# Patient Record
Sex: MALE | Race: WHITE | URBAN - METROPOLITAN AREA
[De-identification: names, ages, dates, MRNs, and addresses within clinical notes are randomized per-mention and may not be internally consistent; named-entity substitution may affect disease eponyms.]

---

## 2018-05-10 ENCOUNTER — TRANSFERRED RECORDS (OUTPATIENT)
Dept: HEALTH INFORMATION MANAGEMENT | Facility: CLINIC | Age: 68
End: 2018-05-10

## 2018-05-29 ENCOUNTER — HOSPITAL ENCOUNTER (OUTPATIENT)
Dept: BEHAVIORAL HEALTH | Facility: CLINIC | Age: 68
Discharge: HOME | End: 2018-05-29
Attending: SOCIAL WORKER | Admitting: SOCIAL WORKER
Payer: COMMERCIAL

## 2018-05-29 VITALS — WEIGHT: 180 LBS | BODY MASS INDEX: 26.66 KG/M2 | HEIGHT: 69 IN

## 2018-05-29 PROCEDURE — H0001 ALCOHOL AND/OR DRUG ASSESS: HCPCS

## 2018-05-29 ASSESSMENT — ANXIETY QUESTIONNAIRES
IF YOU CHECKED OFF ANY PROBLEMS ON THIS QUESTIONNAIRE, HOW DIFFICULT HAVE THESE PROBLEMS MADE IT FOR YOU TO DO YOUR WORK, TAKE CARE OF THINGS AT HOME, OR GET ALONG WITH OTHER PEOPLE: NOT DIFFICULT AT ALL
3. WORRYING TOO MUCH ABOUT DIFFERENT THINGS: NOT AT ALL
2. NOT BEING ABLE TO STOP OR CONTROL WORRYING: NOT AT ALL
6. BECOMING EASILY ANNOYED OR IRRITABLE: NOT AT ALL
7. FEELING AFRAID AS IF SOMETHING AWFUL MIGHT HAPPEN: NOT AT ALL
5. BEING SO RESTLESS THAT IT IS HARD TO SIT STILL: SEVERAL DAYS
4. TROUBLE RELAXING: NOT AT ALL
GAD7 TOTAL SCORE: 2
1. FEELING NERVOUS, ANXIOUS, OR ON EDGE: SEVERAL DAYS

## 2018-05-29 ASSESSMENT — PAIN SCALES - GENERAL: PAINLEVEL: MODERATE PAIN (4)

## 2018-05-29 NOTE — PROGRESS NOTES
COMPREHENSIVE ASSESSMENT    Background Information   Original Date of Assessment:  5/29/2018 Referral Source:  Self   Evaluation Counselor:  AMPARO Foss Counselor Telephone #:   [phone] Assessment Site:  [facility]   Patient Name:   Haley Zhao YOB: 1950 Age:  67 year old Gender:  male Medical Record #:  [id number]   Patient's Primary Language:  English Do you need assistance with reading, writing or hearing?  Do you need a ?  No   Current Address:  [address] [place] [address]   Patient Phone Number:  [phone] (home)    Patient Mobile Number:    Telephone Information:   Mobile [phone]      Patient E-mail Address:  [email or web address]     Which pronouns do you prefer to be referred by?  He/Him     With which race do you identify?  White     This patient was seen for a face to face assessment on 5/29/2018:  Yes       Crisis Intervention Questions     1. Are you currently having severe withdrawal symptoms that are putting yourself or others in danger?  No    2. Are you currently having severe medical problems that require immediate attention?  No    3. Are you currently having severe emotional or behavioral problems that are putting yourself or others at risk of harm?  No    Precipitating Event Summary     What are the circumstances or events that have led up to you participating in this evaluation today?    Seeking support to help change drinking.    Have you participated in prior substance use disorder evaluations?     Yes. When, Where, and What circumstances: [facility] referral here.    Comprehensive Substance Use History    X = Primary Drug Used Age of First Use    Pattern of Substance Use   Make sure to include period of heaviest use in life and a use history within the past year if applicable.  Please include a pattern with a specific range of amounts used and a frequency of use:  (DSM-5: Sx #3) Date  of last use  Quantity of last use if within the past 30 days Withdrawal Potential?  Screen for need of IP detox or other medical intervention Method of use  (Oral, smoked, snorted, IV, etc)   x Alcohol       20 Past 10-12 years: 6-7x/week, up to 2-750mL (wine, 4x/week) 20  9pm no oral    Marijuana/  Hashish     N/A        Cocaine/Crack       N/A        Meth/  Amphetamines       N/A        Heroin       N/A        Other Opiates/  Synthetics     N/A        Inhalants      N/A        Benzodiazepines       N/A        Hallucinogens       N/A        Barbiturates/  Sedatives/  Hypnotics   N/A        Over-the-Counter Drugs     N/A        Other       N/A        Nicotine       N/A         DIMENSION I - Acute Intoxication / Withdrawal Potential     1. Do you use greater amounts of alcohol/other drugs to feel intoxicated, use greater amounts to achieve the desired effect, or use the same amount and get less of an effect?  (DSM-5: Sx #10)     No, for me the opposite is true.    2. Have you ever had an inpatient detoxification admission?  (DSM-5: Sx #11)    No    3. Withdrawal Symptoms:  Within the past year: Within the past 30 days:   None   None       4. Is the patient currently exhibiting symptoms of withdrawal?  (DSM-5: Sx #11)    No    5. Based on the above information, does treatment for withdrawal symptoms appear to be a need at this time?  (DSM-5: Sx #11)    No    Dimension I Ratings Summary   Acute intoxication/Withdrawal potential - The placing authority must use the criteria in Dimension I to determine a client s acute intoxication and withdrawal potential.    RISK DESCRIPTIONS - Severity ratin Client displays full functioning with good ability to tolerate and cope with withdrawal discomfort. No signs or symptoms of intoxication or withdrawal or resolving signs or symptoms.    REASONS SEVERITY WAS ASSIGNED (What about the amount of the person s use and date of most recent use and history of withdrawal problems  suggests the potential of withdrawal symptoms requiring professional assistance?)     Client reports use within 24 hours of evaluation, he denies any withdrawal discomfort or concern.  No signs/symptoms of intoxication or withdrawal observed.         DIMENSION II - Biomedical Complications and Conditions     1. Do you have any current health/medical conditions?(Include any infectious diseases, allergies, chronic or acute pain, history of chronic conditions)       Yes.   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    2. Do you have a health care provider? When was your most recent appointment? What concerns were identified?     [facility], Dr. Wilian Palomino    3. If yes indicated by answers to items 1 or 2: How do you deal with these concerns? Is that working for you? If you are not receiving care for this problem, why not?      medication    4. Please list all of the patient's current medication(s) including health management, psychotropic, pain management, over-the-counter and/or herbal supplements:     Klonopin    5. When did you last take your medication?     5/29/18    6. Do you currently self-administer your medications?      Yes    7. Do you follow current medical recommendations/take medications as prescribed?     Yes    8. Has a health care provider/healer ever recommended that you reduce or quit alcohol/drug use?  (DSM-5: Sx #9)    Yes    9. Are you pregnant?     NA, Male    10. Have you had any injuries, assaults/violence towards you, accidents, health related issues, overdose(s) or hospitalizations related to your use of alcohol or other drugs:  (DSM-5: Sx #8 & #9)    No    11. Have you engaged in any risk-taking behavior that would put you at risk for exposure to blood-borne or sexually transmitted diseases?    No    12. Are you on a special diet?    No    13. Do you have any concerns regarding your nutritional status?    No    14. Have you had any appetite  changes in the last 3 months?    No    15. Have you had weight loss or weight gain of more than 10 lbs in the last 3 months?   If patient gained or lost more than 10 lbs, then refer to program RN / attending Physician for assessment.    No    16. Was the patient informed of BMI?  No        17. Do you have any dental problems?    No    18. Do you have any specific physical needs or disabilities that would need accommodation in a treatment program?     No    Dimension II Ratings Summary   Biomedical Conditions and Complications - The placing authority must use the criteria in Dimension II to determine a client s biomedical conditions and complications.   RISK DESCRIPTIONS - Severity ratin Client displays full functioning with good ability to cope with physical discomfort.    REASONS SEVERITY WAS ASSIGNED (What physical/medical problems does this person have that would inhibit his or her ability to participate in treatment? What issues does he or she have that require assistance to address?)    Client reports he has a primary care provider.  He denies any immediate health concerns or anything that would interfere with treatment.         DIMENSION III - Emotional, Behavioral, Cognitive Conditions and Complications     Childhood Environmental Background     1. Please tell me what it was like growing up in your family. (please include any history of substance abuse, mental health issues, emotional/physical/sexual abuse, forms of discipline, and support)     Raised by mom/dad, 9 children, fourth in birth order.  One sister some alcohol use, the rest are very mild and almost teetotalers. Family hx depression, anxiety, panic disorder, ADD.  My dad was very similar to me, growing up he was 5-6 beer a night.  Once my mom went to the nursing home his consumption seemed to double and then he stopped at the age of 89 year old.  3 paternal uncles.    GAIN Short Screener     2. When was the last time that you had significant  problems...  A. with feeling very trapped, lonely, sad, blue, depressed or hopeless  about the future? 1+ years ago    B. with sleep trouble, such as bad dreams, sleeping restlessly, or falling  asleep during the day? Never    C. with feeling very anxious, nervous, tense, scared, panicked, or like  something bad was going to happen? Never    D. with becoming very distressed and upset when something reminded  you of the past? Never    E. with thinking about ending your life or committing suicide? 1+ years ago    3. When was the last time that you did the following things two or more times?  A. Lied or conned to get things you wanted or to avoid having to do  something? Never    B. Had a hard time paying attention at school, work, or home? Never    C. Had a hard time listening to instructions at school, work, or home? Never    D. Were a bully or threatened other people? Never    E. Started physical fights with other people? Never    Note: These questions are from the Global Appraisal of Individual Needs--Short Screener. Any item marked  past month  or  2 to 12 months ago  will be scored with a severity rating of at least 2.     For each item that has occurred in the past month or past year ask follow up questions to determine how often the person has felt this way or has the behavior occurred? How recently? How has it affected their daily living? And, whether they were using or in withdrawal at the time?    4. If the person has answered item 9E with  in the past year  or  the past month , ask about frequency and history of suicide in the family or someone close and whether they were under the influence.     NA    5. Has anyone close to you, a family member, a friend or a significant other attempted or completed a suicide?     Yes, explain: 1997 brother and he was a drinker.  A cousin did but we never talked about it.    6. If the person answered item 9E  in the past month  ask about intent, plan, means and access and  any other follow-up information to determine imminent risk. Document any actions taken to intervene on any identified imminent risk.      Denies.    PHQ-9, PEPE-7 and Suicide Risk Assessment   PHQ-9 on 5/29/2018 PEPE-7 on 5/29/2018   The patient's PHQ-9 score was 2 out of 27, indicating minimal depression.     The patient's PEPE-7 score was 2 out of 21, indicating minimal anxiety.         Suicide Screening Questions:   Have you wished you were dead or wished you could go to sleep and not wake up?     No   Have you had actual thoughts of killing yourself?     Yes   When did you have these thoughts?     During the recession when I lost my job and just thinking that my family would be better with my insurance money.   Do you have any current intent or active desire to take your life?     No   Do you have a plan to take your life?     No   Have you ever made a suicide attempt?     No   Do you have access to pills, guns or other methods to kill yourself?     No     Guide to Risk Ratings   IDEATION: Active thoughts of suicide? INTENT: Intent to follow on suicide? PLAN: Plan to follow through on suicide? Level of Risk:   IF Yes Yes Yes Patient = High Emergent   IF Yes Yes No Patient = High Urgent/Non-Emergent   IF Yes No No Patient = Moderate Non-Urgent   IF No No   No Patient = Low Risk   The patient's ADDITIONAL RISK FACTORS and lack of PROTECTIVE FACTORS may increase their overall suicide risk ratings.     Patient's Responses (within the last 30 days)   IDEATION: Active thoughts of suicide?    No     INTENT: Intent to follow on suicide?    No     PLAN: Plan to follow through on suicide?    No     Determining the level of risk depends on the patient responses, suicide risk factors and protective factors.     Additional Risk Factors:    Someone close to the patient (family member/friend) completed a suicide     Significant history of having untreated or poorly treated mental health symptoms     Alcohol use   Protective  "Factors:    Having people in his/her life that would prevent the patient from considering committing suicide (i.e. young children, spouse, parents, etc.)     Risk Status   Emergent? No   Urgent / Non-Emergent? No   Present / Non- Urgent? No    Low Risk? Yes, Evaluation Counselors - Document in Epic / SBAR to counselor \"No identified risk\" and Treatment Counselors - Assess weekly in progress notes under Dimension 3 and summarize in Discharge / Treatment summary under Dimension 3.   Additional information to support suicide risk rating: There was no addtional information to provide at this time.  Please see the above suicide risk rating information.     Mental Health History and Mental Health Screening Questions     7. Have you ever been diagnosed with a mental health problem?     No    8. Have you ever been prescribed medications for mental health issues?    Yes, explain: have tried them but they made me feel worse which could be because I am drinking on them so putting a depressant on it.    9. Have you ever worked with a mental health therapist?    Have been seeing a counselor on/off, has been sporadic recently.    10. Do your current mental health providers know about your substance use history and/or about your current substance use?    Yes, explain: the focus was always a serious reduction.    11. Have you ever had an inpatient mental health hospital admission?    No    12. Have you ever hurt yourself, such as cutting, burning or hitting yourself?  No    13. Have you ever been verbally, emotionally, physically or sexually abused?      No    14. Have you lived through any traumatic or stressful life events, such as the death of someone close to you, witnessing violence, being a victim of crime, going through a bad break-up, or any other life event that had caused you significant distress?    Yes, explain: multiple job losses and losing so much of my worth, other thing would be family deaths.    15. If applicable, " have you had any of the following symptoms related to the trauma, abuse or other stressful life events? (dreams, intense memories, flashbacks, physical reactions, etc.)     No    16. If applicable, have you received counseling for trauma or abuse issues?      No    17. Have you ever touched or fondled someone else inappropriately or forced them to have sex with you against their will?    No    18. Have you ever felt obsessed by your sexual behavior, such as having sex with many partners, masturbating often, using pornography often?  No    19. Have you ever purged, binged or restricted yourself as a way to control your weight?  No    20. Have you ever believed people were spying on you, or that someone was plotting against you or trying to hurt you?  No    21. Have you ever believed someone was reading your mind or could hear your thoughts or that you could actually read someone's mind or hear what another person was thinking?  No    22. Have you ever believed that someone or some force outside of yourself was putting thoughts into your mind or made you act in a way that was not your usual self?  Have you ever thought you were possessed?  No    23. Have you ever believed you were being sent special messages through the TV, radio, newspaper or internet?  No    24. Have you ever heard things other people couldn't hear, such as voices or other noises?  No    25. Have you ever had visions when you were awake?  Or have you ever seen things other people couldn't see?  No    26. Have you ever had to lie to people important to you about how much you freitas?  No    27. Have you ever felt the need to bet more and more money?  No    28. Have you ever attempted treatment for a gambling problem?  No    29. Highest grade of school completed:  College graduate    30. Do you have any difficulties with reading, writing or calculating?  No    31. Have you ever been diagnosed with a learning disability, such as ADHD or dyslexia?   No    32. What is your preferred learning style?  by reading    33. Do you have any problems with memory impairment or problem solving?  No    34. Do you have any problems with headaches or dizziness?  No    35. Have you ever been in the ?  No    36. Have you been diagnosed with traumatic brain injury or Alzheimer s?  No    37. Have you ever hit your head or been hit on the head?  No    38. Have you ever had medical treatment for an injury to your head?  NA    39. Have you had any significant illness that affected your brain (brain tumor, meningitis, West Nile Virus, stroke, seizure, heart attack, near drowning or near suffocation)?  No    40. Have you ever been diagnosed with Fetal Alcohol Effects or Fetal Alcohol Syndrome?  No    41. What are your some of your personal strengths?  discipline    Dimension III Ratings Summary   Emotional/Behavioral/Cognitive - The placing authority must use the criteria in Dimension III to determine a client s emotional, behavioral, and cognitive conditions and complications.   RISK DESCRIPTIONS - Severity ratin Client has difficulty with impulse control and lacks coping skills. Client has thoughts of suicide or harm to others without means; however, the thoughts may interfere with participation in some treatment activities. Client has difficulty functioning in significant life areas. Client has moderate symptoms of emotional, behavioral, or cognitive problems. Client is able to participate in most treatment activities.    REASONS SEVERITY WAS ASSIGNED - What current issues might with thinking, feelings or behavior pose barriers to participation in a treatment program? What coping skills or other assets does the person have to offset those issues? Are these problems that can be initially accommodated by a treatment provider? If not, what specialized skills or attributes must a provider have?    Client denies any formal mental health diagnosis but does report he has been  on antidepressants in the past but none current.  He reports he has inconsistently met with individual therapists.  He reports a history of passive suicidal ideation but denies any current ideation, plan or intent.  Screenings indicate minimal depression and anxiety symptoms.  Client does attribute his depression symptoms to his alcohol use.       DIMENSION IV - Readiness to Change     1. What is your motivation for participating in this evaluation today?    I am to the point that I know I cannot stay the way I am but have not been able to to significantly.    2. What problematic behaviors have you engaged in when using alcohol or other drugs that could be hazardous to you or others (i.e. driving a car/motorcycle/boat, operating machinery, unsafe sex, IV drug use, sharing needles, etc.)  (DSM-5: Sx #8)    Are there times I have been over a 0.08, probably but I don't know even what that would feel like.    3. If applicable, when did you first think you had a problem with your alcohol or other drug use?    Always been a regular drinker, never to the extreme and then around 10 years ago I had my first job loss and then multiple since then, and I think my drinking began increasing and I think it was a self-medication  Alcohol us a total depressant for me, and it will take me several days to get over that.    4. Who in your life has shared concerns with you about your use of alcohol or other drugs?    My daughter has a few times.    5. Are there any changes you have made or plan to make regarding how you had been using alcohol or other drugs?    Yes, explain: I guess I am at the point right now where I need to figure out what I want to do.    Dimension IV Ratings Summary   Readiness for Change - The placing authority must use the criteria in Dimension IV to determine a client s readiness for change.   RISK DESCRIPTIONS - Severity ratin Client is motivated with active reinforcement, to explore treatment and strategies  for change, but ambivalent about illness or need for change.    REASONS SEVERITY WAS ASSIGNED - (What information did the person provide that supports your assessment of his or her readiness to change? How aware is the person of problems caused by continued use? How willing is she or he to make changes? What does the person feel would be helpful? What has the person been able to do without help?)      Client is recognizing his own concerns with his alcohol use, is ready to change and willing to follow recommendations.       DIMENSION V - Relapse, Continued Use and Continued Problem Potential     1. If you have had previous periods of sobriety, when was your longest period of sobriety and what were you doing at that time that was supporting your sobriety?  (DSM-5: Sx #2)    I can go 2-3 days but I have to consciously plan it and commit to it.  I wish I could just stop all together.  It shouldn't be that hard to not drink.  I have done things like I noble say I am not going to drink wine because I will drink too much of it, so I will drink beer because I don't feel it because I cannot drink tat much of it.    2. Within the past 30 days, on a scale from 0-10 (0 = having no cravings at all and 10 = having very strong cravings to use alcohol or other drugs) what number would you assign to your cravings? (DSM-5: Sx #4)     Daily.    3. Can you identify any specific reasons or specific triggers that contribute to you being more likely to consume alcohol or other drugs? (DSM-5: Sx #4)    No    4. Have you been treated for alcohol/other substance use disorder? (DSM-5: Sx #2)  No    5. Support group participation: Have you/do you attend 12-step or other support group meetings? How recently? What was your experience? Are you willing to restart? If the person has not participated, is he or she willing?  (DSM-5: Sx #2)    I tried AA a few times but felt like it was all so extreme and that they weren't like me.    6. Do you drink  alcohol or use other drugs in larger amounts than intended or over a longer period of time than was intended?  (DSM-5: Sx #1)    Yes, explain: I would have what I call brown-outs, not remembering what show I was watching or conversations  I had.    7. Do you spend a great deal of time engaged in activities necessary to obtain alcohol or other drugs, a great deal of time using alcohol or other drugs, or a great deal of time recovering from alcohol or other drug use?  (DSM-5: Sx #3)    Yes, explain: daily.    Dimension V Ratings Summary   Relapse/Continued Use/Continued problem potential - The placing authority must use the criteria in Dimension V to determine a client s relapse, continued use, and continued problem potential.   RISK DESCRIPTIONS - Severity rating: 3 Client has poor recognition and understanding of relapse and recidivism issues and displays moderately high vulnerability for further substance use or mental health problems. Client has few coping skills and rarely applies coping skills.    REASONS SEVERITY WAS ASSIGNED - (What information did the person provide that indicates his or her understanding of relapse issues? What about the person s experience indicates how prone he or she is to relapse? What coping skills does the person have that decrease relapse potential?)      Client has no history of treatment.  He reports he has been unable to change and maintain any reduction or abstinence on his own.  He lacks education on addiction, and does identify increasing depression with his alcohol use.  He lacks skills for daily sober living.       DIMENSION VI - Recovery Environment     1. Are you employed or attending school?     for a small company based out of [place], [employer].  Work out of small office in [place] just to be around other people.    2. If working or a student, are you able to function appropriately in that setting?     Yes    3. Has your job and/or school work  been negatively impacted by your use of alcohol of other drugs?  (DSM-5: Sx #5 & Sx #7)    No    4. How would you describe your current finances?  Doing well     5. Are you having financial problems, such as money being tight, living paycheck to paycheck, having unpaid or late bills, having significant debt, a history of bankruptcy, or IRS problems?    No     6. Describe a typical day; evening for you. Work, school, social, leisure activities, volunteer, exercise, spiritual practices or other daily tasks.    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  Then within the first 15 minutes of getting home I drink a glass of wine.  Other stuff is the kids activities, like a baseball game tonight.    7. Have you reduced or discontinued recreational activities, hobbies or other leisure activities as a result of your use of alcohol or other drugs?  (DSM-5: Sx #7)    No    8. Who do you live with?      Wife and 2 kids.    9. Are there any people in the home who have current substance abuse issues or have mental health issues?     My wife drinks a lot too, but she is supportive of me doing this.    10. Tell me about your living environment/neighborhood? Ask enough follow up questions to determine safety, criminal activity, availability of alcohol and drugs, supportive or antagonistic to the person making changes.      No concerns reported.    11. Are you concerned for your safety or anyone else's safety in the home? No    12. Do you have plans to move somewhere else or change your living environment in any manner?    No    13. Do you have children who live with you?     Yes.  (Ask follow-up questions to determine the person s relationship and responsibility, both legal and care giving; and what arrangements are made for supervision for the children when the person is not available.) daughter (16yo), son (16yo)    14. Do you have children who do not  live with you?     No    15. Do you have any history of being involved with Child Protection Services?  No     16. Are you currently in a significant relationship?     Yes.  How long have you been in the relationship?  19 years    17. How do you identify your sexual orientation?    Heterosexual    18. The patient reported: being .    19. Does your significant other have a history of substance abuse or have current substance abuse issues?    Yes, explain: she drinks, we drink differently though    20. How important is substance use to your social connections? Do many of your family or friends use?     Wife and I don't do a lot socially or just together.    21. Who in your life would you consider to be your primary support network at this time?    Spouse and counselor    22. Have any of your relationships (S.O., family members, friends, employers, teachers, etc.) been negatively impacted by your use of alcohol or other drugs?  (DSM-5: Sx #6)    Yes, explain: wife and fight/argue but it is more when we both have been drinking.    23. Do you currently participate in community manjula activities, such as attending Confucianist, temple, Scientology or Muslim services?  No    24. Criminal justice history: Gather current/recent history and any significant history related to substance use--Arrests? Convictions? Circumstances? Alcohol or drug involvement? Sentences? Still on probation or parole? Expectations of the court? Current court order?  (DSM-5: Sx #8)    Denies.    25. Are you or have you ever been a registered sex offender?  No    26. Do you have a child protection worker,  or ?  No    27. Are you currently on any type of commitment?  No, I'm not on any type of commitment at this time.    28. Do you have a valid 's license?  Yes    29. What obstacles exist to participating in treatment? (Time off work, childcare, funding, transportation, pending snf time, living situation)     Travel  for work at times.    Dimension VI Ratings Summary   Recovery environment - The placing authority must use the criteria in Dimension VI to determine a client s recovery environment.   RISK DESCRIPTIONS - Severity ratin Client is engaged in structured, meaningful activity, but peers, family, significant other, and living environment are unsupportive, or there is criminal justice involvement by the client or among the client s peers, significant others, or in the client s living environment.    REASONS SEVERITY WAS ASSIGNED - (What support does the person have for making changes? What structure/stability does the person have in his or her daily life that will increase the likelihood that changes can be sustained? What problems exist in the person s environment that will jeopardize getting/staying clean and sober?)     Client reports that he is  and has two children.  He is employed, denies any job concerns.  He denies any legal issues.  He reports limited social network.  He reports minimal social or meaningful activity for self, and he reports drinking in most free-time.       Mental Health Status   Physical Appearance/Attire: Appears stated age and Attire appropriate to age/situation   Hygiene: well groomed   Eye Contact: at examiner   Speech Rate:  regular   Speech Volume: regular   Speech Quality: fluid   Cognitive/Perceptual:  reality based   Cognition: memory intact    Judgment: intact   Insight: intact   Orientation:  time, place, person and situation   Thought:   logical    Hallucinations:  none   General Behavioral Tone: cooperative   Psychomotor Activity: no problem noted   Gait:  no problem   Mood: appropriate   Affect: congruence/appropriate   Counselor Notes: NA     Patient Choices/Exceptions     Would you like services specific to language, age, gender, culture, Gnosticism preference, race, ethnicity, sexual orientation or disability?  No    What particular treatment choices and options would  you like to have? outpatient    Do you have a preference for a particular treatment program?  open    Patient is willing to follow treatment recommendations.  Yes    DSM-5 Criteria for Substance Use Disorder   Criteria for Diagnosis  Instructions: Determine whether the client currently meets the criteria for Substance Use Disorder using the diagnostic criteria in the DSM-5 pp.481-585. Current means during the most recent 12 months outside a facility that controls access to substances.    A problematic pattern of alcohol/drug use leading to clinically significant impairment or distress, as manifested by at least two of the following, occurring within a 12-month period:    2. There is a persistent desire or unsuccessful efforts to cut down or control alcohol/drug use  3. A great deal of time is spent in activities necessary to obtain alcohol/drug, use alcohol/drug, or recover from its effects.  4. Craving, or a strong desire or urge to use alcohol/drug  6. Continued alcohol use despite having persistent or recurrent social or interpersonal problems caused or exacerbated by the effects of alcohol/drug.  8. Recurrent alcohol/drug use in situations in which it is physically hazardous.  9. Alcohol/drug use is continued despite knowledge of having a persistent or recurrent physical or psychological problem that is likely to have been caused or exacerbated by alcohol.          Specify if: In early remission:  After full criteria for alcohol/drug use disorder were previously met, none of the criteria for alcohol/drug use disorder have been met for at least 3 months but for less than 12 months (with the exception that Criterion A4,  Craving or a strong desire or urge to use alcohol/drug  may be met).     In sustained remission:   After full criteria for alcohol use disorder were previously met, non of the criteria for alcohol/drug use disorder have been met at any time during a period of 12 months or longer (with the  exception that Criterion A4,  Craving or strong desire or urge to use alcohol/drug  may be met).   Specify if:   This additional specifier is used if the individual is in an environment where access to alcohol is restricted.    Mild: Presence of 2-3 symptoms  Moderate: Presence of 4-5 symptoms  Severe: Presence of 6 or more symptoms    DSM-5 Substance Use Disorder Diagnosis     Alcohol Use Disorder Severe - 303.90 (F10.20)    Collateral Contact Summary     Number of contacts made:  1    Contact with referring person:  NA    If court related records were reviewed, summarize here:  NA    Information from collateral contacts supported/largely agreed with information from the client and associated risk ratings.    Collateral Contact      Name: Relationship: Phone number: Releases:   [facility]  [facility]     Substance use evaluation completed on 5/10/18 was received prior to appt for intake and referral.      Collateral Contact      Name: Relationship: Phone number: Releases:   NA              Recommendations     Client is recommended to attend the IOP treatment program at [facility].      Level of Care   I.) Intoxication and Withdrawal: 0   II.) Biomedical:  0   III.) Emotional and Behavioral:  2   IV.) Readiness to Change:  1   V.) Relapse Potential: 3   VI.) Recovery Environmental: 2     Initial Problem List     The patient has poor coping skills  The patient lacks a sober peer support network

## 2018-05-29 NOTE — PROGRESS NOTES
"Visit Date:   05/29/2018      CHEMICAL DEPENDENCY ASSESSMENT      DATE OF EVALUATION:  05/29/2018   EVALUATION COUNSELOR:  AMPARO Foss.   CLIENT'S ADDRESS:  [address] [place] [address].   TELEPHONE:  [phone].   STATISTICS:  YOB: 1950.  Age:  67.  Sex:  Male.  Marital Status:  .   INSURANCE:  [facility].   REFERRAL SOURCE:  Self.      REASON FOR EVALUATION:  Haley \"Rile\" Pavel reports he was seen for a chemical health assessment a couple weeks before today due to his own concerns with his drinking and he is interested in setting up services to support making the changes at this point.  He is willing to stop drinking.      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  He denies any other health conditions or concerns.  He does go through [facility] for primary care and he is currently taking Klonopin.  Denies any other medications.      HISTORY OF PREVIOUS TREATMENT AND COUNSELING:  Client denies any history of detox admissions or any hospitalizations related to drug or alcohol use.  He reports he has been doing individual therapy off and on.  He denies any history of chemical dependency treatment and reports a few years ago he did attend a few AA meetings, nothing current.      HISTORY OF ALCOHOL AND DRUG USE:  Client reports alcohol use, age of first use 20.  He reports for the past 10-12 years, he has been drinking at least 6-7 times a week, usually up to two 750 mL bottle of wine per time.  At least 4 times a week he will do that.  Last use was 05/28/2018.  Client denies any other substance use.      SUMMARY OF CHEMICAL DEPENDENCY SYMPTOMS ACKNOWLEDGED BY CLIENT:  Client identifies with 6 out of the 11 DSM-5 criteria for impression of a substance use disorder.      SUMMARY OF COLLATERAL DATA:  [facility] sent over evaluation prior to this appointment for screening and referral purposes.      MENTAL " STATUS ASSESSMENT:  Physical appearance and attire:  Appears stated age and attire appropriate to age and situation.  Hygiene well groomed.  Eye contact at examiner.  Speech regular, volume regular, quality fluid.  Cognitive perceptual reality based.  Cognition:  Memory intact, judgment intact, insight intact.  Orientation to time, place, person and situation.  Thought logical.  Hallucinations:  None.  General behavioral tone:  Cooperative.  Psychomotor activity:  No problem noted.  Gait:  No problem.  Mood appropriate.  Affect congruent and appropriate.      VULNERABLE ADULT ASSESSMENT:  This person is not a functional vulnerable adult according to [place] statute 626.5572, subdivision 21.      DIAGNOSTIC IMPRESSION:  F10.20, alcohol use disorder, severe.      [facility] PLACEMENT CRITERIA:   DIMENSION 1:  Intoxication withdrawal:  Risk level 0.  Client reports use within 24 hours of evaluation.  He denies any withdrawal discomfort or concern.  No signs or symptoms of intoxication or withdrawal observed.      DIMENSION 2:  Biomedical conditions:  Risk level 0.  Client reports he has a primary care provider.  He denies any immediate health concerns or anything that would interfere with treatment.      DIMENSION 3:  Emotional/Behavioral:  Risk level 2.  Client denies any formal mental health diagnosis, but does report he has been on antidepressants in the past but none current.  He reports he has inconsistently met with an individual therapist.  He reports history of passive suicidal ideation but denies any current ideation, plan or intent.  Screens indicate minimal depression and anxiety symptoms.  Client does attribute his depression symptoms to his alcohol use.      DIMENSION 4:  Readiness to change:  Risk level 1.  Client is recognizing his own concerns with his alcohol use, is ready to change and willing to follow recommendations.      DIMENSION 5:  Relapse and Continued Use Potential:  Risk level 3.  Client has no  history of treatment.  He reports he has been unable to change or maintain any reduction or absence on his own.  He lacks education on addiction and does identify increase in depression with alcohol use.  He lacks skills for daily sober living.      DIMENSION 6:  Recovery Environment:  Risk level 2.  Client reports that he is  and has 2 children.  He is employed.  Denies any job concerns.  He denies any legal issues.  He reports limited social network.  He reports minimal social or meaningful activity for self and he reports drinking in most free time.      INITIAL PROBLEM LIST:  Client has poor coping skills and lacks sober peer support network.      RECOMMENDATIONS:  Client is recommended to attend the IOP treatment program at [facility].      RATIONALE:  Client meets criteria for substance use disorder.  He has had unsuccessful attempts on his own to stop drinking and he would benefit from outpatient support in order to develop an education about addiction as well as personalize his own use, motivation for change and develop coping skills for sober lifestyle.         This information has been disclosed to you from records protected by Federal confidentiality rules (42 CFR part 2). The Federal rules prohibit you from making any further disclosure of this information unless further disclosure is expressly permitted by the written consent of the person to whom it pertains or as otherwise permitted by 42 CFR part 2. A general authorization for the release of medical or other information is NOT sufficient for this purpose. The Federal rules restrict any use of the information to criminally investigate or prosecute any alcohol or drug abuse patient.      CHERELLE GOODWIN [facility]             D: 2018   T: 2018   MT: NANCY      Name:     GEORGIA ARTHUR   MRN:      [id number]        Account:      [id number]   :      1950           Visit Date:   2018      Document: [id number]

## 2018-05-30 ASSESSMENT — ANXIETY QUESTIONNAIRES: GAD7 TOTAL SCORE: 2

## 2018-05-30 ASSESSMENT — PATIENT HEALTH QUESTIONNAIRE - PHQ9: SUM OF ALL RESPONSES TO PHQ QUESTIONS 1-9: 2

## 2018-06-11 ENCOUNTER — HOSPITAL ENCOUNTER (OUTPATIENT)
Dept: BEHAVIORAL HEALTH | Facility: CLINIC | Age: 68
End: 2018-06-11
Attending: SOCIAL WORKER
Payer: COMMERCIAL

## 2018-06-11 PROCEDURE — H2035 A/D TX PROGRAM, PER HOUR: HCPCS

## 2018-06-11 PROCEDURE — H2035 A/D TX PROGRAM, PER HOUR: HCPCS | Mod: HQ

## 2018-06-11 NOTE — PROGRESS NOTES
Patient:  Haley Zhao    Date: June 11, 2018    Comprehensive Assessment UPDATE        Comprehensive Summary Update and Review  Counselor met with patient on 6/11/18 and reviewed the Comprehensive Assessment.    The following updates have been made: Last use date changed from 5/28/18 to 6/10/18.

## 2018-06-11 NOTE — PROGRESS NOTES
Initial Services Plan        Before your first treatment group, please do the following    Immediate health & safety concerns: Other: no immediate concerns noted    Suggestions for client during the time between intake & completion of treatment plan:  Tour your treatment center (unit or outpatient clinic).  Introduce yourself to the treatment group.  Spend time getting to know your peers.  Complete the problem list for your treatment plan.  Review your patient or client handbook.  Identify concerns about whom to ask for family week    Client issues to be addressed in the first treatment sessions:  Identify motivations(s) for coming to treatment, i.e. legal, family, job, self      Horacio Maravilla [facility]  6/11/2018  5:04 PM

## 2018-06-12 ENCOUNTER — HOSPITAL ENCOUNTER (OUTPATIENT)
Dept: BEHAVIORAL HEALTH | Facility: CLINIC | Age: 68
End: 2018-06-12
Attending: SOCIAL WORKER
Payer: COMMERCIAL

## 2018-06-12 PROBLEM — F19.10 SUBSTANCE ABUSE (H): Status: ACTIVE | Noted: 2018-06-12

## 2018-06-12 PROCEDURE — H2035 A/D TX PROGRAM, PER HOUR: HCPCS | Mod: HQ

## 2018-06-13 ENCOUNTER — HOSPITAL ENCOUNTER (OUTPATIENT)
Dept: BEHAVIORAL HEALTH | Facility: CLINIC | Age: 68
End: 2018-06-13
Attending: SOCIAL WORKER
Payer: COMMERCIAL

## 2018-06-13 PROCEDURE — H2035 A/D TX PROGRAM, PER HOUR: HCPCS | Mod: HQ

## 2018-06-13 NOTE — PROGRESS NOTES
Comprehensive Assessment Summary     Based on client interview, review of previous assessments and   comprehensive assessment interview the following diagnosis and recommendations are:     Patient: Haley Zhao  MRN; [id number]   : 1950  Age: 67 year old Sex: male       Client meets criteria for:  Alcohol Use Disorder Severe - 303.90 (F10.20)    Dimension One: Acute Intoxication/Withdrawal Potential     Ratin  (Consider the client's ability to cope with withdrawal symptoms and current state of intoxication)   Client reports last date of use of alcohol as 6/10/2018.  Client exhibits no signs of intoxication or withdrawal.    Dimension Two: Biomedical Condition and Complications    Ratin  (Consider the degree to which any physical disorder would interfere with treatment for substance abuse, and the client's ability to tolerate any related discomfort; determine the impact of continued chemical use on the unborn child if the client is pregnant)   Client denies any biomedical conditions that would interfere with treatment.  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  Client endorses medication compliance.  Client has medical insurance and appears able to navigate the healthcare system independently.      Dimension Three: Emotional/Behavioral/Cognitive Conditions & Complications  Ratin  (Determine the degree to which any condition or complications are likely to interfere with treatment for substance abuse or with functioning in significant life areas and the likelihood of risk of harm to self or others)   Client denies any formal mental health diagnosis or current medications.  He endorses feelings of depression at times, and has tried taking anti-depressants in the past.  Client identifes increasing depression with his alcohol use.  His PHQ-9 score was 2, indicating minimal depression, and his PEPE-7 score was  2 indicating minimal anxiety.  Client endorses attending sporadic sessions with an individual therapist.  Client was evaluated at assessment and rated as low risk for suicide.  Client admits to passive suicidal ideation 10 years ago but denies any current suicidal ideation, intent or plan.  Client created a Patient Safety Plan and will be monitored throughout treatment.     Dimension Four: Treatment Acceptance/Resistance     Ratin  (Consider the amount of support and encouragement necessary to keep the client involved in treatment)   Client verbalizes motivation and readiness to change but lacks consistent behavior to maintain abstinence as evidenced by continued drinking after assessment.  Client appears to be in the contemplation stage of change as he is seeking help to address his addiction, admitted that he is unable to quit on his own, and expresses willingness to follow recommendations.    Dimension Five: Continued Use/Relaspe Prevention     Rating:  3  (Consider the degree to which the client's recognizes relapse issues and has the skills to prevent relapse of either substance use or mental health problems)   Client denies any detox admissions or treatment experience.  He reports he has been unable to change and maintain any reduction or abstinence on his own.  Client appears to lack positive coping skills and relapse prevention skills. Client appears to be at moderately high risk for relapse/recidivism.      Dimension Six: Recovery Environment     Ratin  (Consider the degree to which key areas of the client's life are supportive of or antagonistic to treatment participation and recovery)   Client is employed full-time and lives with his wife and 2 teenaged children.  Client denies any job concerns.  Client reports his wife is a heavy drinker, but describes her as supportive.  Client denies having any concerns regarding his immediate living environment or neighborhood.  He endorses strained  relations with his spouse, especially when they are drinking.  He reports minimal social or meaningful activity for self, and he reports drinking during most of his free-time.  Client lacks a current sober support network.  Client denies any legal involvement.    I have reviewed the information on the assessment, psychosocial and medical history and checklist:        it is current

## 2018-06-13 NOTE — PROGRESS NOTES
Patient Safety Plan Template    Name:   Haley Zhao YOB: 1950 Age:  67 year old MR Number:  [id number]   Step 1: Warning signs (Thoughts, images, mood, situation, behavior) that a crisis may be developin. I am down more than I should be; mild depression     2. Not as happy as I would like to be/or was     3. More stress in my life/marriage just OK     Step 2: Internal coping strategies - Things I can do to take my mind off of my problems without contacting another person (relaxation technique, physical activity):     1. Walking, treadmill, bike riding     2. Reading, yardwork     3. Looking at yoga     Step 3: People and social settings that provide distraction:     1. Name: Rivas Santamaria   Phone: [phone]   2. Name: Del Zhao   Phone: [phone]   3. Place: sporting events   4. Place: movies/TV     The one thing that is most important to me and worth living for is: my kids     Step 4: People whom I can ask for help:     1. Name: Rivas Rosalio   Phone: [phone]     2. Name: Lorena Yury   Phone: [phone]     3. Name: Elizabeth Dove   Phone: [phone]     Step 5: Professionals or agencies I can contact during a crisis:     1. Clinician Name: Dr. Wilian Palomino   Phone: [phone]   Emergency Contact #: Chris Zhao [phone]     2. Clinician Name: Elizabeth Dove, therapist   Phone: [phone]     Clinician Pager or Emergency Contact #: NA     3. Local Urgent Care Services: [facility]    Urgent Care Services Address: [address] [place] [address]      Urgent Care Services Phone: [phone]       4. Suicide Prevention Lifeline Phone: [phone]-RXEE ([phone])     Step 6: Making the environment safe:     1. Reduce exposure to alcohol     2. NA     Safety Plan Template 2008 Mery Love and Earnest Shen is reprinted with the express permission of the authors.  No portion of the Safety Plan Template may be reproduced without the express, written  permission.  You can contact the authors at [email or web address] or [email or web address].

## 2018-06-14 ENCOUNTER — HOSPITAL ENCOUNTER (OUTPATIENT)
Dept: BEHAVIORAL HEALTH | Facility: CLINIC | Age: 68
End: 2018-06-14
Attending: SOCIAL WORKER
Payer: COMMERCIAL

## 2018-06-14 PROCEDURE — H2035 A/D TX PROGRAM, PER HOUR: HCPCS | Mod: HQ

## 2018-06-14 PROCEDURE — H2035 A/D TX PROGRAM, PER HOUR: HCPCS

## 2018-06-14 NOTE — PROGRESS NOTES
Client forgot his treatment planning session was 6/13/18 at 4:00pm.  He did attend group as scheduled.  Tx plan session rescheduled to 4pm 6/14/18.    AMPARO Mehta

## 2018-06-15 NOTE — PROGRESS NOTES
CD ADULT Progress Note     Treatment Plan Review completed on:  6/14/2018     Attendance Dates:  6/11/18, 6/12/18 and 6/13/18     Total # of Group Sessions:  Phase I:  4 plus service initiation and treatment planning sessions     MONDAY TUESDAY WEDNESDAY THURSDAY FRIDAY SATURDAY SUNDAY Total   Group Therapy 2 hours  2 hours     4 hours   Specialty Groups*  2 hours  3 hours    5 hours   1:1 1 hour  1 hour     2 hours   Family Program           [place]             Phase II             Absent           Total 3 hours 2 hours 3 hours 3 hours    11 hours     *Specialty Groups include Mental Health Care, Assertiveness and Communication, Sobriety Maintenance Skills, Spiritual Care, Stress Management, Relapse Prevention, Family Systems.                    Learning Style:  reading    Staff member contributing: AMPARO Mehta     Received supervision:  No    Client: contributed to goals and plan:  yes    Did Client receive a copy of treatment plan/revised plan: Yes; signed 6/14/2018    Changes to Treatment Plan: No    Client agrees with plan/revised plan: Yes    Any changes in Vulnerable Adult Status:  No    Substance Use Disorders:  Alcohol Use Disorder Severe (F10.20) 303.90    1) Care Coordination Activities:  none  2) Medical, Mental Health and other appointments the client attended: none  3) Medication issues: none  4) Physical and mental health problems: See dimensions 2 and 3 below for further details.   5) Review and evaluation of the individual abuse prevention plan: KAYLEN VEGA Risk Ratings and Data       DIMENSION 1: Acute Intoxication/Withdrawal  The client's ability to cope with withdrawal symptoms and current state of intoxication       Acute Intoxication/Withdrawal - Current Risk Factor:  0    Reporting sober date of 6/11/2018    Goals:  Develop effective strategies to maintain sobriety. Report to counselor and group any alcohol or substance use.     Data: Client reports last use of alcohol as 6/10/2018.   Client exhibits no signs of intoxication or withdrawal.    DIMENSION 2:  Biomedical Conditions and Complaints  The degree to which any physical disorder would interfere with treatment for substance abuse and the client's ability to tolerate any related discomfort     Biomedical Conditions and Complaints - Current Risk Factor:  0    Goals: Disclose CD status to medical providers and follow up with medical interventions while in IOP, and maintain good health.     Data: Client denies any biomedical conditions that would interfere with treatment.  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  Client endorses medication compliance.  Client denies any new or worsening physical problems.    DIMENSION 3:  Emotional/Behavioral/Cognitive Conditions and Complications  The degree to which any condition or complications are likely to interfere with treatment for substance abuse or with function in significant life areas and the likelihood of risk of harm to self or others.     Emotional/Behavioral - Current Risk Factor:  2    DSM-5 Diagnoses:   Client denies any formal diagnosis, but reports feelings of depression.       Suicide Assessment: low risk  Risk Status    Ideation - Active thoughts of suicide Intent to follow through on suicide Plan for completing suicide    Yes No Yes No Yes No   Emergent  x  x  x   Urgent / Non-Emergent  x  x  x   Non- Urgent  x  x  x   No Current/Active Risk  x  x  x          Goals: Understand the relationship between mental health concerns and addiction.     Data: Client denies any mental health issues or medication.  He has seen an individual therapist to help him with his drinking and depression in the past, and endorses a scheduled appointment next week.  Client's protective factors include supportive people in his life, education about self-harm warning signs, and a belief system that suicide is not personally acceptable.   "     Client rates his depression, anxiety, or other mental health concerns as 3 of 10 (10 highest), due to \"stress and alcohol\".  Client denies any thoughts of hurting himself or others, stating \"no, not serious\".  Client explains he always has an awareness due to his brother's suicide many years ago..  Client states he is feeling \"busy,frustrated, and overwhelmed\" due to work and other scheduling, and states he needs more \"prioritization\".      DIMENSION 4:  Readiness to Change  Consider the amount of support and encouragement necessary to keep the client involved in treatment.     Readiness to Change - Current Risk Factor:  2    Goals:  Understand the impact your substance use has had on you, your family, and significant relationships, and increase internal motivation to change.     Data: Client rates his motivation for recovery as 7 of 10 (10 highest).  Client expresses ambivalence, and questions whether he can become a moderate drinker in the future.  Client rationalizes how much he is drinking and appears to have minimal awareness of the extent of his addiction.  Client will prepare his first assignment as a way to increase his motivation.  Risk factor raised.     DIMENSION 5:  Relapse/Continued Use/Continued Problem Potential  Consider the degree to which the client recognizes relapse issues and has the skills to prevent relapse of either substance use or mental health problems.     Relapse/Continued Use/Continued Problem Potential - Current Risk Factor:  3    Goals:  Identify personal triggers and relapse warning signs, and develop sober coping and living skills in order to address the development of a strategy for long term recovery.    Data: Client denies any previous detox admissions or treatment experience.  He reports he has been unable to change and maintain any reduction or abstinence on his own.  Client rates his strongest craving to use in the past week as 8 of 10 (10 highest).  Client states he " "\"start this program\" as something he did to change his thinking and behaviors for the sake of his sobriety.  Client identifies a personal strength of \"communication\".    DIMENSION 6:  Recovery Environment  Consider the degree to which key areas of the client's life are supportive of or antagonistic to treatment participation and recovery.     Recovery Environment - Current Risk Factor:  2    Support group attended this week:  No    Did family agree to attend family week:  TBD    If yes: NA    Goals:  Increase sober support network. Continue to identify and attend sober support group meetings.     Data: Client reports no attendance at sober support meetings this week.  Client denies having a sponsor.  Client states things are \"good - not great\" at home and work.         Intervention:   Behavioral Therapy, Cognitive Behavioral Therapy, Counselor Feedback, Education, Emotional management, Group Feedback, Motivational Interviewing, Relapse Prevention, Twelve Step Facilitation, REBT, Existentialismand DBT.   This week we discussed the neurobiology of addiction, motivation to change, relapse prevention, recovery planning, recognition of triggers and cravings, the Abstinence Violation Effect, and how to re-frame associated thoughts and find alternative healthy behaviors.  We read and discussed Marly s  The Bridge  to help identify the need to let go of addict thinking and beliefs, and take personal responsibility to pursue recovery and prevent relapse.  Video  Ricardo to Self:  Protecting Sobriety with the Science of Safety  was presented and discussed.  Client participated with the check- in process and a group exercise to isdentify triggers.  The Relapse Cycle was presented and discussed.  Readiness to change was addressed and developed through group peer member presentation of their  10 Worst Consequences  and other assignments.  Client was an active participant and provided supportive feedback.  Client participated in " "a self-soothing exercise.  Group members each expressed something they were grateful for.  Client affirmed and supported for his willingness to explore treatment despite his expressed ambivalence.    Assessment:  Stages of Change Model  Contemplation      Plan:   Acclimate client to group and IOP.   Client will identify 4 strategies he can use to prevent relapse.  Prepare first half \"10 Worst Consequences\" assignment.  Client will exercise 3X before next week.  Research support group meetings.    BRANDO Mehta, LADC   "

## 2018-06-15 NOTE — PROGRESS NOTES
"Haley Zhao  June 14, 2018  [id number]    [facility] Mental Health Process Group Note    Day and Date and Time of Service:  Thurs, 6/14/18 from 5:30 PM - 8:30 PM     Number of participants: 2     Length of Group: 3 hours    Goal of the Group: Participate in mindfulness exercises to help clients focus on the present and increase the awareness of thoughts and emotions.  This process helps clients be able to detach from unhelpful thinking patterns and be less affected by negative emotions. This practice has been shown to decrease reactivity and help facilitate effective action to work on change in the present and thereby create a brighter future. This practice has also been shown to increase resiliency if practiced regularly.        Client s Treatment Plan Dimension 3 - Intervention(s)  addressed this session:      Attend MH group on mindfulness      [place]: Group Topics and Activities     I.  D3 Intervention and Group Topic addressed: Mindfulness to facilitate effective action            A.  Client's level of participation:  Snow participated fully and was open-minded to the group process.    II.  Motivational Component: Clients did a [place] Exercise that tracks any changes in clients  thoughts and feelings, before and after the mindfulness exercise.  Active participation, Snow described a work problem in detail with many frustrations attached to it in his first [place] exercise. After the mindfulness exercise, he wrote that he needed to \"stay focused, prioritize more, have better communication, seek support, only try to control what I can control, have patience.\"  He said, \"It was like my first sheet was all the problem and the second sheet was all about the solution.\"     III. Review of Progress:   A. Client self-report: He said he learned a lot from the group. Snow said he was drinking 2-3 glasses of wine a day for 30 years. When the recession came, he lost a job, and this was very difficult for him \"at my stage of " "life.\"  He said the hardest time for him to abstain is when he is alone. He denied having \"hangovers\" but endorsed having periods where he couldn't remember all the details of the night before, the TV shows or movies he was watching.      B. Therapist report: This is the client's first group with this therapist. He said he enjoyed learning about mindfulness and cognitive de-fusion. He had asked about a \"diary\" right before I explained the journal formats that I request they do nightly and he was very interested in \"retraining the brain.\"     IV.  Self-Care Activity: Create I AM poem;  objective: to increase self-awareness in the present moment. Active participation, his poem was motivational and he said it had \"a positive spin.\" He endorsed having determination and also inserted some doubt referring to he doesn't know the future but was motivation and positive. Snow admitted that he may someday want to try to drink again and he wasn't sure this was a problem. We discussed the things to be aware of in this type of \"experimentation\" and that it was like Russian Roulette in some ways. I asked that he give abstinence a chance for at least 6 months and focus on all the benefits. This was discouraged but I also advised that If he still wants to experiment at a later date, to do so with someone sober and concentrate on the true effects instead of addictive voice and its euphoric recall.      V.   Reflection and evaluation of today s group experience:  Gave verbal feedback and filled out eval form, positive comments of the group     Additional Client Responses and/or Assessment: Snow may want to transfer to a day group as he wants to be part of his children's sports in the evenings.    Consultation with primary counselor:   Yes, I discussed Snow's participation with Horacio, his primary counselor, after the group.    Therapeutic techniques in this session:  Motivational Therapy, Supportive, Behavioral Modification and " Mindfulness    Additional Treatment Referrals/Follow-up Issues to Address: No, I explained the other groups available in the daytime and referred him back to Horacio who has more details and would know better which group may fit him best    Change Treatment Plan:  No, he did complete one intervention that will be entered into his treatment plan     Change Diagnosis:  No, Snow did not appear to have severe symptoms at this time but did speak about the health risks involved in using alcohol and that he the frustration at work made him want a drink when he came home. No indication of symptoms becoming more severe.

## 2018-06-15 NOTE — ADDENDUM NOTE
Encounter addended by: Horacio Maravilla, [facility] on: 6/15/2018  1:19 PM<BR>     Actions taken: Flowsheet accepted

## 2018-06-25 NOTE — PROGRESS NOTES
Visit Date:   06/14/2018      CHEMICAL DEPENDENCY DISCHARGE SUMMARY:       LAST DATE OF SERVICE: 06/14/2018.      EVALUATION COUNSELOR:  AMPARO Foss.   TREATMENT COUNSELOR:  AMPARO Del Cid.   REFERRAL SOURCE:  Self.   PROGRAM:  [facility] Adult Intensive Outpatient chemical dependency program in [facility]'s CD group.   ADMISSION DATE:  6/11/2018.   LAST SESSION DATE:  6/14/2018.   DISCHARGE DATE:  6/25/2018.   ADMISSION IMPRESSION:  Alcohol use disorder, severe, 303.90, F10.20.   DISCHARGE IMPRESSION:  Alcohol use disorder, severe, 303.90, F10.20.   REASON FOR DISCHARGE: Client stopped attending programming.   LAST USE DATE:  Unknown, but last reported as 6/17/2018.   HOURS OF TREATMENT COMPLETED:  This client completed 4 sessions of Phase 1 plus initial service session and treatment planning session for a total of 11 hours of treatment.      REASON FOR EVALUATION:  Haley Zhao self-referred to [facility] for a CD evaluation on 5/29/2018, seeking support to help change his drinking.      SERVICES PROVIDED:  The services provided included treatment and discharge planning, psychoeducation, recovery skill building, relapse prevention skills, problem solving, managing conflicts and communication skill building, clarifying values, expressing feelings, social skill building, 12-step facilitation, family/concern personal group therapy, individual psychotherapy and group therapy.       ISSUES ADDRESSED IN TREATMENT:   DIMENSION 1:  Acute Withdrawal Issues and Detoxification:  Admit risk rating 0.  Discharge risk rating 0.  Client's goal in this dimension was for client to develop effective strategies to maintain sobriety.  Client stopped attending programming and did not complete any treatment strategies.      DIMENSION 2:  Biomedical Conditions and Complaints:  Admit  risk rating 0.  Discharge risk rating 0.  The client reports biomedical condition of  burning mouth syndrome.  Client reported medication compliance.  Client's goals were to disclose his CD status to medical providers, follow up with medical interventions while in IOP and to maintain good physical health.  Client stopped attending programming and did not complete any treatment strategies.      DIMENSION 3:  Emotional, Behavioral, Cognitive Conditions and Complications:  Admission risk rating 2, discharge risk rating 2.  Client's goal in this area was to increase understanding of how substance use and mental health issues interact.  Client attended 1 mental health skills group, but otherwise did not complete any treatment strategies.      DIMENSION 4:  Readiness to Change:  Admit risk rating 1.  Discharge risk rating 3. Client's goals in this dimension were to understand the impact substance use has had on him, his family and significant relationships and to increase internal motivation to change.  Client expressed during treatment that he was ambivalent about quitting drinking and admitted to drinking the weekend after his first week of treatment. Client then stopped attending programming and did not complete any treatment strategies.  In telephone encounter 6/21/2018 client indicated he may return to treatment in the fall when his schedule will ease.     DIMENSION 5:  Relapse, Continued Use, Continued Problem Potential:  Admit risk rating 3, discharge risk rating 3. Client's goals in this area were to identify personal triggers and relapse warning signs, develop sober coping and living skills in order to address the development of a strategy for a long-term recovery.  Client stopped attending programming and did not complete any treatment strategies.      DIMENSION 6:  Recovery Environment:  Admit risk screening 2, discharge risk rating 2.  Client's goals in this dimension were to increase his sober support network and to continue to identify and attend sober support group meetings.  Client stopped  attending programming and did not complete any treatment strategies.      STRENGTHS:  Client appeared to be open to learning about himself and to be supportive of others.  Client reported he thought communication was one of his personal strengths.      PROGNOSIS:  This client has an unfavorable prognosis at this time.  This can be upgraded if client follows the continuing care recommendations below.      LIVING ARRANGEMENTS AT DISCHARGE:  Client lives independently with his wife and 2 children.  Client reports his wife drinks frequently, but is supportive.      CONTINUATION OF CARE RECOMMENDATIONS:  Client is recommended to abstain from all nonprescribed mood-altering substances.  Client is recommended to continue to manage his mental and physical health with his healthcare providers and follow recommendations made by them.  Client is recommended to continue to take medications as prescribed and continually follow up with his prescribing healthcare providers as directed to do so.  Client is recommended to attend sober support meetings on a weekly and regular basis.  Client is recommended to obtain and maintain regular and weekly contact with a sponsor.  Client is recommended to remain law abiding.  Client is recommended to obtain an updated assessment and seek admission to a  recommended treatment program.         This information has been disclosed to you from records protected by Federal confidentiality rules (42 CFR part 2). The Federal rules prohibit you from making any further disclosure of this information unless further disclosure is expressly permitted by the written consent of the person to whom it pertains or as otherwise permitted by 42 CFR part 2. A general authorization for the release of medical or other information is NOT sufficient for this purpose. The Federal rules restrict any use of the information to criminally investigate or prosecute any alcohol or drug abuse patient.      JAIR LOZA [facility]              D: 2018   T: 2018   MT: NTS      Name:     GEORGIA ARTHUR   MRN:      [id number]        Account:      [id number]   :      1950           Visit Date:   2018      Document: [id number]